# Patient Record
Sex: MALE | Race: WHITE | Employment: UNEMPLOYED | URBAN - NONMETROPOLITAN AREA
[De-identification: names, ages, dates, MRNs, and addresses within clinical notes are randomized per-mention and may not be internally consistent; named-entity substitution may affect disease eponyms.]

---

## 2017-03-02 ENCOUNTER — LAB ENCOUNTER (OUTPATIENT)
Dept: LAB | Age: 37
End: 2017-03-02
Attending: FAMILY MEDICINE
Payer: MEDICAID

## 2017-03-02 DIAGNOSIS — R56.9 SEIZURE (HCC): Primary | ICD-10-CM

## 2017-03-02 DIAGNOSIS — G40.909 EPILEPSY (HCC): ICD-10-CM

## 2017-03-02 LAB — VALPROIC ACID: 94.1 UG/ML (ref 50–100)

## 2017-03-02 PROCEDURE — 36415 COLL VENOUS BLD VENIPUNCTURE: CPT

## 2017-03-02 PROCEDURE — 80164 ASSAY DIPROPYLACETIC ACD TOT: CPT

## 2017-03-13 ENCOUNTER — OFFICE VISIT (OUTPATIENT)
Dept: FAMILY MEDICINE CLINIC | Facility: CLINIC | Age: 37
End: 2017-03-13

## 2017-03-13 VITALS
OXYGEN SATURATION: 93 % | TEMPERATURE: 98 F | DIASTOLIC BLOOD PRESSURE: 80 MMHG | SYSTOLIC BLOOD PRESSURE: 102 MMHG | HEART RATE: 105 BPM

## 2017-03-13 DIAGNOSIS — G80.8 OTHER CEREBRAL PALSY (HCC): ICD-10-CM

## 2017-03-13 DIAGNOSIS — J69.0 ASPIRATION PNEUMONIA DUE TO GASTRIC SECRETIONS, UNSPECIFIED LATERALITY, UNSPECIFIED PART OF LUNG (HCC): Primary | ICD-10-CM

## 2017-03-13 DIAGNOSIS — Z91.89 AT RISK FOR ASPIRATION: ICD-10-CM

## 2017-03-13 PROCEDURE — 99214 OFFICE O/P EST MOD 30 MIN: CPT | Performed by: FAMILY MEDICINE

## 2017-03-13 NOTE — PROGRESS NOTES
Chris Sloan is a 31year old male. Patient presents with:  Hospital F/U: f.up from [facility] from 2/19-2/27 respiratory issues. ...room 5      HPI:   Here for eval  Pt has bee in the hosp at [facility] for  resp issues  And has finished meds for aspiration 2/24/2016      Social History:    Smoking Status: Never Smoker                      Comment: Non-smoker    Alcohol Use: No                 BP Readings from Last 6 Encounters:  03/13/17 : 102/80  12/28/16 : 110/62  09/15/16 : 118/72  05/12/16 secretions, unspecified laterality, unspecified part of lung ([facility] [address]   (Inactive)  -  Primary     resolved   Discontinue all pleasure feeding as this is problematic  Continue:  Enteral Infusion Pump with alarm.                Return if symptoms worsen or fail

## 2017-03-15 ENCOUNTER — TELEPHONE (OUTPATIENT)
Dept: FAMILY MEDICINE CLINIC | Facility: CLINIC | Age: 37
End: 2017-03-15

## 2017-04-13 ENCOUNTER — TELEPHONE (OUTPATIENT)
Dept: FAMILY MEDICINE CLINIC | Facility: CLINIC | Age: 37
End: 2017-04-13

## 2017-04-13 NOTE — TELEPHONE ENCOUNTER
[phone] fax, Phone: [phone].

## 2017-04-13 NOTE — ASSESSMENT & PLAN NOTE
Discontinue all pleasure feeding as this is problematic  Continue:  Enteral Infusion Pump with alarm.

## 2017-04-19 ENCOUNTER — MED REC SCAN ONLY (OUTPATIENT)
Dept: FAMILY MEDICINE CLINIC | Facility: CLINIC | Age: 37
End: 2017-04-19

## 2017-05-02 ENCOUNTER — TELEPHONE (OUTPATIENT)
Dept: FAMILY MEDICINE CLINIC | Facility: CLINIC | Age: 37
End: 2017-05-02

## 2017-05-02 NOTE — TELEPHONE ENCOUNTER
[facility]an[facility] and he verified that even though the signature line states supplier signature that this where DR. Jha should sign and he will double check with his supervisor but this is the form they sent for all previous orders.

## 2017-06-05 ENCOUNTER — OFFICE VISIT (OUTPATIENT)
Dept: FAMILY MEDICINE CLINIC | Facility: CLINIC | Age: 37
End: 2017-06-05

## 2017-06-05 VITALS
DIASTOLIC BLOOD PRESSURE: 74 MMHG | SYSTOLIC BLOOD PRESSURE: 110 MMHG | HEART RATE: 106 BPM | RESPIRATION RATE: 10 BRPM | TEMPERATURE: 99 F | OXYGEN SATURATION: 90 %

## 2017-06-05 DIAGNOSIS — G80.8 OTHER CEREBRAL PALSY (HCC): ICD-10-CM

## 2017-06-05 DIAGNOSIS — G40.909 SEIZURE DISORDER, PRIMARY (HCC): Primary | ICD-10-CM

## 2017-06-05 PROCEDURE — 99213 OFFICE O/P EST LOW 20 MIN: CPT | Performed by: FAMILY MEDICINE

## 2017-06-05 RX ORDER — VALPROIC ACID 250 MG/5ML
17 SOLUTION ORAL EVERY 12 HOURS
Refills: 11 | COMMUNITY
Start: 2017-05-08

## 2017-06-14 NOTE — ASSESSMENT & PLAN NOTE
Recent seizures   Mild med adjust  Will see neuro soon    Seems to be back to baseline    No known trigger  This episode

## 2017-06-14 NOTE — PROGRESS NOTES
Stacie Cobian is a 31year old male. Patient presents with:  Er F/u: er for multiple seizures. . room 6      HPI:   Patient recently in the emergency room. He had multiple seizures. He has a known seizure disorder.   His medicines were mildly altered w Encounters:  12/28/16 : 90 lb  05/12/16 : 98 lb  02/24/16 : 85 lb  09/12/14 : 85 lb  04/11/14 : 95 lb  08/28/13 : 90 lb      REVIEW OF SYSTEMS:   GENERAL HEALTH: feels well no complaints  SKIN: denies any unusual skin lesions or rashes  RESPIRATORY: denies issues and agrees to the plan.

## 2017-06-19 ENCOUNTER — MED REC SCAN ONLY (OUTPATIENT)
Dept: FAMILY MEDICINE CLINIC | Facility: CLINIC | Age: 37
End: 2017-06-19

## 2017-07-26 ENCOUNTER — TELEPHONE (OUTPATIENT)
Dept: FAMILY MEDICINE CLINIC | Facility: CLINIC | Age: 37
End: 2017-07-26

## 2017-07-26 NOTE — TELEPHONE ENCOUNTER
Per Amy Gan, patient has had this wet, productive cough for two weeks, phlegm is white/clear in color, denies temps, no wheezing. Patient has been drooling a lot as well.  She has given patient Muscinex and Robitussin OTC and both of these helped elevate his

## 2017-07-26 NOTE — TELEPHONE ENCOUNTER
COUGH. TRIED TO OFFER APPT. FOR TOMORROW BUT SHE SAID DR. SIMMONS GIVES HIM ANTIBOTICS FOR HIS COUGH SOMETIMES.

## 2017-07-27 RX ORDER — LEVOFLOXACIN 500 MG/1
500 TABLET, FILM COATED ORAL DAILY
Qty: 10 TABLET | Refills: 0 | Status: SHIPPED | OUTPATIENT
Start: 2017-07-27 | End: 2017-08-06

## 2017-08-08 ENCOUNTER — MED REC SCAN ONLY (OUTPATIENT)
Dept: FAMILY MEDICINE CLINIC | Facility: CLINIC | Age: 37
End: 2017-08-08

## 2017-09-18 ENCOUNTER — OFFICE VISIT (OUTPATIENT)
Dept: FAMILY MEDICINE CLINIC | Facility: CLINIC | Age: 37
End: 2017-09-18

## 2017-09-18 VITALS
DIASTOLIC BLOOD PRESSURE: 70 MMHG | TEMPERATURE: 99 F | HEART RATE: 100 BPM | OXYGEN SATURATION: 98 % | SYSTOLIC BLOOD PRESSURE: 116 MMHG

## 2017-09-18 DIAGNOSIS — R05.3 CHRONIC COUGH: ICD-10-CM

## 2017-09-18 DIAGNOSIS — G40.909 SEIZURE DISORDER, PRIMARY (HCC): Primary | ICD-10-CM

## 2017-09-18 DIAGNOSIS — G80.9 CEREBRAL PALSY, UNSPECIFIED TYPE (HCC): ICD-10-CM

## 2017-09-18 PROCEDURE — 99214 OFFICE O/P EST MOD 30 MIN: CPT | Performed by: FAMILY MEDICINE

## 2017-09-20 NOTE — PROGRESS NOTES
Bambi Flores is a 30year old male.   Patient presents with:  Er F/u: room 6      HPI:   Pt here for follow  Was in the er  For a  Seizure    This did not last long  And pt recovered    There was no specific abnormality    And levels of meds appeared no Encounters:  09/18/17 : 116/70  06/05/17 : 110/74  03/13/17 : 102/80  12/28/16 : 110/62  09/15/16 : 118/72  05/12/16 : 98/70      Wt Readings from Last 6 Encounters:  12/28/16 : 90 lb  05/12/16 : 98 lb  02/24/16 : 85 lb  09/12/14 : 85 lb  04/11/14 : 95 lb prescriptions requested or ordered in this encounter              Iam Chen M.D., FAAFP      The patient indicates understanding of these issues and agrees to the plan.

## 2017-10-11 ENCOUNTER — OFFICE VISIT (OUTPATIENT)
Dept: FAMILY MEDICINE CLINIC | Facility: CLINIC | Age: 37
End: 2017-10-11

## 2017-10-11 VITALS
OXYGEN SATURATION: 92 % | HEART RATE: 104 BPM | TEMPERATURE: 99 F | DIASTOLIC BLOOD PRESSURE: 68 MMHG | SYSTOLIC BLOOD PRESSURE: 110 MMHG

## 2017-10-11 DIAGNOSIS — G80.8 OTHER CEREBRAL PALSY (HCC): ICD-10-CM

## 2017-10-11 DIAGNOSIS — K44.9 HIATAL HERNIA: ICD-10-CM

## 2017-10-11 DIAGNOSIS — Z93.1 GASTROSTOMY IN PLACE (HCC): ICD-10-CM

## 2017-10-11 DIAGNOSIS — R05.3 CHRONIC COUGH: Primary | ICD-10-CM

## 2017-10-11 DIAGNOSIS — Z91.89 AT RISK FOR ASPIRATION: ICD-10-CM

## 2017-10-11 PROCEDURE — 99214 OFFICE O/P EST MOD 30 MIN: CPT | Performed by: FAMILY MEDICINE

## 2017-10-11 NOTE — PROGRESS NOTES
Kimberlee Garcia is a 30year old male. Patient presents with:  Er F/u: mucus in lungs. . persistant phlegm. . room 5      HPI:   Patient here for follow-up. Has been seen in the ER recently. He has persistent cough and a lot of mucus that he produces. Polyethylene Glycol 3350 (MIRALAX) Oral Powder  Disp: 255 g Rfl: 0     No current facility-administered medications on file prior to visit.       Past Medical History:   Diagnosis Date   • At risk for aspiration 2/24/2016   • Cerebral palsy ([facility] [address] 10/2/2012 SKIN: denies any unusual skin lesions or rashes  RESPIRATORY:see HPI      GI: denies abdominal pain and denies heartburn  Ft in place   soft  NEURO: denies headaches    EXAM:   /68   Pulse 104   Temp 98.7 °F (37.1 °C) (Temporal)   SpO2 92%  There is

## 2017-10-11 NOTE — PATIENT INSTRUCTIONS
What Is a Hiatal Hernia? Hiatal hernia is when the area where the stomach and esophagus meet bulges up through the diaphragm into the chest cavity. In some cases, part of the stomach may bulge above the diaphragm.  Stomach acid may move up into the eso · Ask about surgery, if needed. Surgery is a treatment choice for some people. Your healthcare provider can determine if surgery is an option for you.     Date Last Reviewed: 10/1/2016  © [id number] The [address] [place] [address],

## 2017-11-06 ENCOUNTER — TELEPHONE (OUTPATIENT)
Dept: FAMILY MEDICINE CLINIC | Facility: CLINIC | Age: 37
End: 2017-11-06

## 2017-11-06 RX ORDER — ALBUTEROL SULFATE 2.5 MG/3ML
2.5 SOLUTION RESPIRATORY (INHALATION) EVERY 6 HOURS PRN
Qty: 75 ML | Refills: 3 | Status: SHIPPED | OUTPATIENT
Start: 2017-11-06 | End: 2018-04-06

## 2017-11-06 NOTE — TELEPHONE ENCOUNTER
ALIE WOULD LIKE TO SPEAK TO [address] [place] [address] ABOUT GETTING A NEBULIZER FOR MIKAYLA. PLEASE CALL HER BACK.

## 2017-11-06 NOTE — TELEPHONE ENCOUNTER
Spoke with Dr. Melanie Clancy; v/o to dispense nebulizer.   Mom states that pt does aspirate a lot and also has a couch     Which Neb solution should be sent to [employer]

## 2017-11-07 ENCOUNTER — OFFICE VISIT (OUTPATIENT)
Dept: FAMILY MEDICINE CLINIC | Facility: CLINIC | Age: 37
End: 2017-11-07

## 2017-11-07 VITALS — DIASTOLIC BLOOD PRESSURE: 68 MMHG | SYSTOLIC BLOOD PRESSURE: 120 MMHG | TEMPERATURE: 98 F

## 2017-11-07 DIAGNOSIS — R05.3 CHRONIC COUGH: ICD-10-CM

## 2017-11-07 DIAGNOSIS — Z93.1 GASTROSTOMY IN PLACE (HCC): ICD-10-CM

## 2017-11-07 DIAGNOSIS — J18.9 PNEUMONIA OF LEFT LOWER LOBE DUE TO INFECTIOUS ORGANISM: Primary | ICD-10-CM

## 2017-11-07 DIAGNOSIS — Z91.89 AT RISK FOR ASPIRATION: ICD-10-CM

## 2017-11-07 PROCEDURE — 99214 OFFICE O/P EST MOD 30 MIN: CPT | Performed by: FAMILY MEDICINE

## 2017-11-07 NOTE — ASSESSMENT & PLAN NOTE
Discussed possible use of anticholinergic to decrease secretion will hold for now mother seeking ENT

## 2017-11-07 NOTE — PROGRESS NOTES
Velia Le is a 30year old male. Patient presents with:  Hospital F[facility]      HPI:   Patient in [facility] October 23 - October 28 for aspiration pneumonia.     Patient has had repeated episodes with [facility] lamoTRIgine (LAMICTAL) 200 MG Oral Tab TAKE 1 TABLET BY MOUTH TWICE DAILY Disp: 60 tablet Rfl: 0   Misc.  Devices Does not apply Misc SAPNA LIFTDX CODE 780.72, 718.46, 343.9 Disp: 1 Device Rfl: 0   Polyethylene Glycol 3350 (MIRALAX) Oral Powder  Disp: 255 g HPI & Hospital Course: Patient is a 30 y.o. male with PMH of cerebral palsy with developmental delay, seizures brought in by mother. History obtained by mother at bedside. Mother was concerned for cough which patient developed over the past 3 days.  Patient with mother who endorses full understanding and is agreeable.      Disposition: to home in stable condition     Patient Instructions:     Discharge Meds  Current Discharge Medication List     START taking these medications   Details   cefUROXime 500 mg tabl REVIEW OF SYSTEMS:   GENERAL HEALTH: feels well no complaints  SKIN: denies any unusual skin lesions or rashes  RESPIRATORY: see HPI      EXAM:   /68   Temp 97.7 °F (36.5 °C) (Temporal)  There is no height or weight on file to calculate BMI.       G Sig: Use with nebulizer                     Ryder Fang M.D., FAAFP      The patient indicates understanding of these issues and agrees to the plan.

## 2017-11-16 ENCOUNTER — TELEPHONE (OUTPATIENT)
Dept: FAMILY MEDICINE CLINIC | Facility: CLINIC | Age: 37
End: 2017-11-16

## 2017-11-16 NOTE — TELEPHONE ENCOUNTER
recieved paperwork from [employer] in regards to continuation of enteral feeding supplies Not all pages received. Called and spoke with Natividad at [facility] who will refax paperwork.    Last OV printed and attached to paperwork that was recieved

## 2018-01-25 ENCOUNTER — TELEPHONE (OUTPATIENT)
Dept: FAMILY MEDICINE CLINIC | Facility: CLINIC | Age: 38
End: 2018-01-25

## 2018-01-25 NOTE — TELEPHONE ENCOUNTER
REQ. RECORDS 01-23-16 TO 01-23-18 TO [facility] FROM [facility]DIV OF [facility] SERVICES

## 2018-02-13 ENCOUNTER — APPOINTMENT (OUTPATIENT)
Dept: LAB | Age: 38
End: 2018-02-13
Attending: FAMILY MEDICINE
Payer: MEDICAID

## 2018-02-13 DIAGNOSIS — G40.909 SEIZURE DISORDER, PRIMARY (HCC): ICD-10-CM

## 2018-02-13 PROCEDURE — 36415 COLL VENOUS BLD VENIPUNCTURE: CPT

## 2018-02-13 PROCEDURE — 80164 ASSAY DIPROPYLACETIC ACD TOT: CPT

## 2018-02-14 LAB — VALPROIC ACID: 94.4 UG/ML (ref 50–100)

## 2018-03-05 ENCOUNTER — MED REC SCAN ONLY (OUTPATIENT)
Dept: FAMILY MEDICINE CLINIC | Facility: CLINIC | Age: 38
End: 2018-03-05

## 2018-04-05 ENCOUNTER — TELEPHONE (OUTPATIENT)
Dept: FAMILY MEDICINE CLINIC | Facility: CLINIC | Age: 38
End: 2018-04-05

## 2018-04-05 RX ORDER — LEVOFLOXACIN 25 MG/ML
500 SOLUTION ORAL DAILY
Qty: 200 ML | Refills: 0 | Status: SHIPPED | OUTPATIENT
Start: 2018-04-05 | End: 2018-06-21

## 2018-04-05 NOTE — TELEPHONE ENCOUNTER
Alfonso Ballard states that pt developed a cough about a week ago that is getting worse and now pt is coughing up phlegm with a greenish tint to it. Alfonso Ballard states that there is definitely chest congestion with slight ratting on inspiration.  Alfonso Ballard also states that she

## 2018-04-06 RX ORDER — ALBUTEROL SULFATE 2.5 MG/3ML
SOLUTION RESPIRATORY (INHALATION)
Qty: 75 ML | Refills: 0 | Status: SHIPPED | OUTPATIENT
Start: 2018-04-06 | End: 2018-04-30

## 2018-04-25 ENCOUNTER — TELEPHONE (OUTPATIENT)
Dept: FAMILY MEDICINE CLINIC | Facility: CLINIC | Age: 38
End: 2018-04-25

## 2018-04-25 NOTE — TELEPHONE ENCOUNTER
HE HAS [facility] NOW AND SHE WAS TOLD THAT YOU NEED TO CALL THEM TO AUTHORIZE THE ALBUTEROL NEBULIZER FOR HIM AND THE NUMBER SHE GAVE ME FOR YOU TO Sanjeev [phone]

## 2018-04-25 NOTE — TELEPHONE ENCOUNTER
Spoke to Seth at [address] [place] [address] who states that the medication is covered for 30mL/per 30 days. Per Seth there needs to be quantity override.    Override performed with Seth  Ref # [id number]  Expedited status, will know in 24 hours

## 2018-04-26 NOTE — TELEPHONE ENCOUNTER
Fax received from [employer]. Message on fax: This medication does not require authorization.  It is covered at the lowest possible copay and further copay lowering cannot be approved    Arturo Farfan advised and states she will call [facility] again and discuss

## 2018-05-01 RX ORDER — ALBUTEROL SULFATE 2.5 MG/3ML
SOLUTION RESPIRATORY (INHALATION)
Qty: 75 ML | Refills: 0 | Status: SHIPPED | OUTPATIENT
Start: 2018-05-01 | End: 2018-07-10

## 2018-06-20 ENCOUNTER — TELEPHONE (OUTPATIENT)
Dept: FAMILY MEDICINE CLINIC | Facility: CLINIC | Age: 38
End: 2018-06-20

## 2018-06-20 NOTE — TELEPHONE ENCOUNTER
Mom states that pt is having greenish mucus and productive cough. Mom denies fever or decreasing O2 sats. Mom states she has been giving pt a mucus thinner but thinks he needs an abx as well. Odell Mukherjee that Dr. Emy Foote is out of the office.  Alfonso Ballard is re

## 2018-06-21 RX ORDER — LEVOFLOXACIN 25 MG/ML
500 SOLUTION ORAL DAILY
Qty: 200 ML | Refills: 0 | Status: SHIPPED | OUTPATIENT
Start: 2018-06-21 | End: 2018-09-28

## 2018-06-23 ENCOUNTER — MED REC SCAN ONLY (OUTPATIENT)
Dept: FAMILY MEDICINE CLINIC | Facility: CLINIC | Age: 38
End: 2018-06-23

## 2018-06-27 ENCOUNTER — TELEPHONE (OUTPATIENT)
Dept: FAMILY MEDICINE CLINIC | Facility: CLINIC | Age: 38
End: 2018-06-27

## 2018-07-05 ENCOUNTER — MED REC SCAN ONLY (OUTPATIENT)
Dept: FAMILY MEDICINE CLINIC | Facility: CLINIC | Age: 38
End: 2018-07-05

## 2018-07-06 ENCOUNTER — OFFICE VISIT (OUTPATIENT)
Dept: FAMILY MEDICINE CLINIC | Facility: CLINIC | Age: 38
End: 2018-07-06

## 2018-07-06 VITALS
HEART RATE: 96 BPM | OXYGEN SATURATION: 93 % | SYSTOLIC BLOOD PRESSURE: 102 MMHG | TEMPERATURE: 99 F | DIASTOLIC BLOOD PRESSURE: 72 MMHG

## 2018-07-06 DIAGNOSIS — H55.09 ROTARY NYSTAGMUS: ICD-10-CM

## 2018-07-06 DIAGNOSIS — G40.909 SEIZURE DISORDER, PRIMARY (HCC): ICD-10-CM

## 2018-07-06 DIAGNOSIS — G80.8 OTHER CEREBRAL PALSY (HCC): ICD-10-CM

## 2018-07-06 DIAGNOSIS — Z93.1 GASTROSTOMY IN PLACE (HCC): ICD-10-CM

## 2018-07-06 DIAGNOSIS — Z00.00 ROUTINE GENERAL MEDICAL EXAMINATION AT A HEALTH CARE FACILITY: Primary | ICD-10-CM

## 2018-07-06 PROCEDURE — 99395 PREV VISIT EST AGE 18-39: CPT | Performed by: FAMILY MEDICINE

## 2018-07-06 NOTE — PROGRESS NOTES
Aydee Mcfarlane is a 30year old male.     CC:  Patient presents with:  Physical: room 5    Subjective   HPI:  Still a lot of mucus    Allergies:  No Known Allergies   Current Meds:    Current Outpatient Prescriptions on File Prior to Visit:  HARDY [place] Onset   • Diabetes Other         No family status information on file. Smoking status: Never Smoker                                                              Comment: Non-smoker  Alcohol use:  No                     Immunization History  Administere is not tender,FROM of the back  Contractures and  Slight stoop forward  EXTREMITIES: no cyanosis, clubbing or edema  NEURO: baseline  Makes verbal noises  Seems to respond to mother                ASSESSMENT:    Problem List Items Addressed This Visit

## 2018-07-09 PROBLEM — J18.9 PNEUMONIA OF LEFT LOWER LOBE DUE TO INFECTIOUS ORGANISM: Status: RESOLVED | Noted: 2017-10-23 | Resolved: 2018-07-09

## 2018-07-10 RX ORDER — ALBUTEROL SULFATE 2.5 MG/3ML
SOLUTION RESPIRATORY (INHALATION)
Qty: 75 ML | Refills: 0 | Status: SHIPPED | OUTPATIENT
Start: 2018-07-10 | End: 2018-08-25

## 2018-07-13 ENCOUNTER — TELEPHONE (OUTPATIENT)
Dept: FAMILY MEDICINE CLINIC | Facility: CLINIC | Age: 38
End: 2018-07-13

## 2018-07-13 NOTE — TELEPHONE ENCOUNTER
Physical faxed to [facility] after verifying correct fax number.

## 2018-08-06 ENCOUNTER — TELEPHONE (OUTPATIENT)
Dept: FAMILY MEDICINE CLINIC | Facility: CLINIC | Age: 38
End: 2018-08-06

## 2018-08-06 RX ORDER — MONTELUKAST SODIUM 4 MG/500MG
4 GRANULE ORAL NIGHTLY
Qty: 30 PACKET | Refills: 0 | Status: SHIPPED | OUTPATIENT
Start: 2018-08-06 | End: 2018-08-28

## 2018-08-06 NOTE — TELEPHONE ENCOUNTER
Per Dr. Jha: ok to switch to 4mg granulated as 10mg does not come granulated  Med sent  Amboy advised

## 2018-08-06 NOTE — TELEPHONE ENCOUNTER
Mom states that pt is very congested from his allergies and pt has been on Zyrtec for a while but it doesn't seem to be helping.  Mom is wanting to know if pt can be switched to Singulair granules instead  Please send to [facility]

## 2018-08-14 ENCOUNTER — TELEPHONE (OUTPATIENT)
Dept: FAMILY MEDICINE CLINIC | Facility: CLINIC | Age: 38
End: 2018-08-14

## 2018-08-25 RX ORDER — ALBUTEROL SULFATE 2.5 MG/3ML
SOLUTION RESPIRATORY (INHALATION)
Qty: 75 ML | Refills: 0 | Status: SHIPPED | OUTPATIENT
Start: 2018-08-25 | End: 2018-10-07

## 2018-08-28 ENCOUNTER — TELEPHONE (OUTPATIENT)
Dept: FAMILY MEDICINE CLINIC | Facility: CLINIC | Age: 38
End: 2018-08-28

## 2018-08-28 RX ORDER — MONTELUKAST SODIUM 5 MG/1
TABLET, CHEWABLE ORAL
Qty: 60 TABLET | Refills: 0 | Status: SHIPPED | OUTPATIENT
Start: 2018-08-28 | End: 2018-10-26

## 2018-08-28 NOTE — TELEPHONE ENCOUNTER
Mom is wanting to know if the Singulair that was sent in can be increased to an adult dose. Mom states that the 4mg is for kids/infants.  Also mom is wanting to know  If the granules can be dissolved in water as the box only references applesauce, formula,

## 2018-08-28 NOTE — TELEPHONE ENCOUNTER
Spoke with Dr. Ashley Florez in regards to pt's needs: v/o  10mg chewable tablets (2 5mg tablets) dissolved in water.     Alexus Kelley advised and verbalized understanding of the information provided  Med sent

## 2018-08-29 ENCOUNTER — TELEPHONE (OUTPATIENT)
Dept: FAMILY MEDICINE CLINIC | Facility: CLINIC | Age: 38
End: 2018-08-29

## 2018-08-29 NOTE — TELEPHONE ENCOUNTER
Approval for Montelukast 5mg chewable tablets received with sig of pt is to crush 2 tablets daily to total 10mg and dissolve in water (g-tube)    Approval good from 8/28/2018 thru 8/28/2019    Left detailed message on [facility] vm

## 2018-08-29 NOTE — TELEPHONE ENCOUNTER
Spoke with Linda Trimble at [facility] that a PA form will be faxed to the office and turn around time will be 24 to 72 hours  Hold message for paperwork

## 2018-09-18 ENCOUNTER — APPOINTMENT (OUTPATIENT)
Dept: LAB | Age: 38
End: 2018-09-18
Attending: FAMILY MEDICINE
Payer: MEDICAID

## 2018-09-18 DIAGNOSIS — G40.909 SEIZURE DISORDER, PRIMARY (HCC): ICD-10-CM

## 2018-09-18 LAB — VALPROATE SERPL-MCNC: 104.5 UG/ML (ref 50–100)

## 2018-09-18 PROCEDURE — 80164 ASSAY DIPROPYLACETIC ACD TOT: CPT

## 2018-09-18 PROCEDURE — 36415 COLL VENOUS BLD VENIPUNCTURE: CPT

## 2018-09-19 DIAGNOSIS — G40.909 SEIZURE DISORDER, PRIMARY (HCC): Primary | ICD-10-CM

## 2018-09-28 ENCOUNTER — TELEPHONE (OUTPATIENT)
Dept: FAMILY MEDICINE CLINIC | Facility: CLINIC | Age: 38
End: 2018-09-28

## 2018-09-28 RX ORDER — LEVOFLOXACIN 25 MG/ML
SOLUTION ORAL
Qty: 200 ML | Refills: 0 | Status: SHIPPED | OUTPATIENT
Start: 2018-09-28 | End: 2018-10-26

## 2018-09-28 NOTE — TELEPHONE ENCOUNTER
MIKAYLA HAS ANOTHER RESPIRATORY INFECTION W/GREEN PHLEM. MOM WOULD LIKE TO SPK WITH NURSE.  PLEASE CALL

## 2018-09-28 NOTE — TELEPHONE ENCOUNTER
Gets respiratory infection frequently,prone to aspiration. the last time she noticed the green phlegm was 6/18. Was treated with levaquin at that time. Currently no fever, c/o cough. [facility] [place]. Needs liquid to go through PEG.

## 2018-09-28 NOTE — TELEPHONE ENCOUNTER
Would recommend the use of albuterol nebulizer treatments 4 times daily  May start Levaquin suspension, 500 mg daily times 10 days via G-tube  If fevers develop, cough becomes more productive, any signs of respiratory distress, patient will need emergency

## 2018-10-03 ENCOUNTER — TELEPHONE (OUTPATIENT)
Dept: FAMILY MEDICINE CLINIC | Facility: CLINIC | Age: 38
End: 2018-10-03

## 2018-10-03 NOTE — TELEPHONE ENCOUNTER
SHE SAID THAT HE WAS ADMITTED TO [address] [place] [address] ON [address] AND SHE WILL LET YOU KNOW WHEN HE IS DISCHARGED

## 2018-10-08 RX ORDER — ALBUTEROL SULFATE 2.5 MG/3ML
SOLUTION RESPIRATORY (INHALATION)
Qty: 75 ML | Refills: 0 | Status: SHIPPED | OUTPATIENT
Start: 2018-10-08 | End: 2018-11-29

## 2018-10-10 ENCOUNTER — TELEPHONE (OUTPATIENT)
Dept: FAMILY MEDICINE CLINIC | Facility: CLINIC | Age: 38
End: 2018-10-10

## 2018-10-10 NOTE — TELEPHONE ENCOUNTER
FELI: Mirlande Glass TO LET DR Priyanka Joyner KNOW ARIELLA WAS DISCHARGED FROM [facility] 10/4/18 AND RETURNED TO HIS GROUP HOME

## 2018-10-16 ENCOUNTER — TELEPHONE (OUTPATIENT)
Dept: FAMILY MEDICINE CLINIC | Facility: CLINIC | Age: 38
End: 2018-10-16

## 2018-10-16 NOTE — TELEPHONE ENCOUNTER
Try milk of magnesia to help with the bowel movement. Packing P bought over-the-counter and taken as directed. I printed out a list of other things he can try for the hiccups.

## 2018-10-16 NOTE — TELEPHONE ENCOUNTER
Alexus Kelley states that patient developed the hiccups Thursday and it has not stopped. Alexus Kelley states that pt is mildly bloated but she feels that is due to pt not having a decent BM in a couple days. Alexus Kelley denies fever or patient appearing to be uncomfortable.  An

## 2018-10-26 ENCOUNTER — OFFICE VISIT (OUTPATIENT)
Dept: FAMILY MEDICINE CLINIC | Facility: CLINIC | Age: 38
End: 2018-10-26
Payer: MEDICAID

## 2018-10-26 VITALS
HEART RATE: 102 BPM | OXYGEN SATURATION: 93 % | SYSTOLIC BLOOD PRESSURE: 98 MMHG | TEMPERATURE: 99 F | DIASTOLIC BLOOD PRESSURE: 60 MMHG

## 2018-10-26 DIAGNOSIS — G40.909 SEIZURE DISORDER, PRIMARY (HCC): Primary | ICD-10-CM

## 2018-10-26 PROCEDURE — 1111F DSCHRG MED/CURRENT MED MERGE: CPT | Performed by: FAMILY MEDICINE

## 2018-10-26 PROCEDURE — 99214 OFFICE O/P EST MOD 30 MIN: CPT | Performed by: FAMILY MEDICINE

## 2018-10-26 RX ORDER — MONTELUKAST SODIUM 5 MG/1
TABLET, CHEWABLE ORAL
Qty: 60 TABLET | Refills: 11 | Status: SHIPPED | OUTPATIENT
Start: 2018-10-26

## 2018-10-26 RX ORDER — LORATADINE 10 MG/1
10 TABLET ORAL
COMMUNITY
Start: 2018-10-05

## 2018-10-29 NOTE — PROGRESS NOTES
Melita Silva is a 41year old male.   Patient presents with:  Hospital F/U    Subjective   HPI:   Here after hospital stay  Patient had seizures  These resolved    Thought related to his metabolic state and the levels were ok    So no changes in medic START taking these medications   Details   amoxicillin-clavulanate 875-125 mg per tablet 1 tablet by Per PEG Tube route 2 (two) times daily for 5 days. Qty: 10 tablet, Refills: 0     loratadine 10 mg tablet 1 tablet by Per PEG Tube route daily.   Qty: HOURS AS NEEDED FOR WHEEZING Disp: 75 mL Rfl: 0   Multiple Vitamin (MULTI-VITAMIN DAILY) Oral Tab Take  by mouth. Disp:  Rfl:    Oral Hygiene Products (OROPHARYNGEAL SUCTION CATHETER) Mouth/Throat Misc PT NEEDING PORTABLE ORAL SUCTION DEVICE AND SUPPLIES. no complaints--by mother caretaker report    SKIN: denies any unusual skin lesions or rashes  RESPIRATORY: denies shortness of breath with exertion  CARDIOVASCULAR: denies chest pain on exertion  GI: denies abdominal pain and denies heartburn  NEURO: denie

## 2018-10-31 ENCOUNTER — TELEPHONE (OUTPATIENT)
Dept: FAMILY MEDICINE CLINIC | Facility: CLINIC | Age: 38
End: 2018-10-31

## 2018-10-31 NOTE — TELEPHONE ENCOUNTER
PT MOM HAS QUESTION ABOUT MIKAYLA NOT FEELING WELL, TEMP [id number] NOT SURE IF INFECTION.  PLEASE CALL

## 2018-10-31 NOTE — TELEPHONE ENCOUNTER
Mom states that this morning pt had a temp of 99 and she did provide Tylenol. Mom states that the fever did go down. Mom also states that pt gets a little sweaty so she uses cool rags to help cool him off.  Advised mom that it does not sound like an infecti

## 2018-11-08 ENCOUNTER — TELEPHONE (OUTPATIENT)
Dept: FAMILY MEDICINE CLINIC | Facility: CLINIC | Age: 38
End: 2018-11-08

## 2018-11-09 RX ORDER — LACTULOSE 10 G/15ML
10 SOLUTION ORAL 2 TIMES DAILY
Qty: 180 ML | Refills: 0 | Status: SHIPPED | OUTPATIENT
Start: 2018-11-09

## 2018-11-09 NOTE — TELEPHONE ENCOUNTER
Mom calls back, he's experiencing no BM for the past couple of days. Prior to that he was ill last Wednesday, diarrhea, fever, for 24 hours. From last Wednesday until this Wednesday continued with liquid, foul smelling stools.  Mom started giving him probi

## 2018-11-09 NOTE — TELEPHONE ENCOUNTER
Yes   Because he has certainly had constipation problems    Can he take something oral  Like   Lactulose 15/5 1 tsp po bid  Until stools regular again

## 2018-11-29 RX ORDER — ALBUTEROL SULFATE 2.5 MG/3ML
SOLUTION RESPIRATORY (INHALATION)
Qty: 75 ML | Refills: 0 | Status: SHIPPED | OUTPATIENT
Start: 2018-11-29 | End: 2018-12-31

## 2018-12-03 ENCOUNTER — TELEPHONE (OUTPATIENT)
Dept: FAMILY MEDICINE CLINIC | Facility: CLINIC | Age: 38
End: 2018-12-03

## 2018-12-03 DIAGNOSIS — G40.909 SEIZURE DISORDER, PRIMARY (HCC): Primary | ICD-10-CM

## 2018-12-03 NOTE — TELEPHONE ENCOUNTER
Order placed.   Future Appointments   Date Time Provider Haley Villafuerte   12/4/2018  3:00 PM [facility] [place]

## 2018-12-03 NOTE — TELEPHONE ENCOUNTER
MOM IS ASKING WHAT LABS ARIELLA SHOULD HAVE TO SHED LIGHT ON HIS ONGOING HEALTH ISSUES? MOM SAYS SHE DOESN'T THINKS HE HAS HAD ANNUAL LAB WORK DONE.  PLEASE CALL

## 2018-12-04 ENCOUNTER — IMMUNIZATION (OUTPATIENT)
Dept: FAMILY MEDICINE CLINIC | Facility: CLINIC | Age: 38
End: 2018-12-04
Payer: MEDICAID

## 2018-12-04 DIAGNOSIS — Z23 NEED FOR VACCINATION: ICD-10-CM

## 2018-12-04 PROCEDURE — 90471 IMMUNIZATION ADMIN: CPT | Performed by: FAMILY MEDICINE

## 2018-12-04 PROCEDURE — 90686 IIV4 VACC NO PRSV 0.5 ML IM: CPT | Performed by: FAMILY MEDICINE

## 2018-12-19 ENCOUNTER — TELEPHONE (OUTPATIENT)
Dept: FAMILY MEDICINE CLINIC | Facility: CLINIC | Age: 38
End: 2018-12-19

## 2018-12-19 RX ORDER — LEVOFLOXACIN 25 MG/ML
500 SOLUTION ORAL DAILY
Qty: 200 ML | Refills: 0 | Status: SHIPPED | OUTPATIENT
Start: 2018-12-19 | End: 2018-12-29

## 2018-12-19 NOTE — TELEPHONE ENCOUNTER
Left detailed message for Joanna Bustamante in regards to medication being sent to the pharmacy.  Office number provided for call back if needed

## 2018-12-19 NOTE — TELEPHONE ENCOUNTER
Mom states that pt has a been coughing a lot form a chest cold and she does not want it to turn into something more. Mom states that she is using the nebulizer but is wanting to know if an abx can be added.

## 2018-12-31 RX ORDER — ALBUTEROL SULFATE 2.5 MG/3ML
SOLUTION RESPIRATORY (INHALATION)
Qty: 75 ML | Refills: 0 | Status: SHIPPED | OUTPATIENT
Start: 2018-12-31 | End: 2019-03-05

## 2019-01-11 ENCOUNTER — APPOINTMENT (OUTPATIENT)
Dept: LAB | Age: 39
End: 2019-01-11
Attending: FAMILY MEDICINE
Payer: MEDICAID

## 2019-01-11 ENCOUNTER — TELEPHONE (OUTPATIENT)
Dept: FAMILY MEDICINE CLINIC | Facility: CLINIC | Age: 39
End: 2019-01-11

## 2019-01-11 DIAGNOSIS — G40.909 SEIZURE DISORDER, PRIMARY (HCC): ICD-10-CM

## 2019-01-11 LAB — VALPROATE SERPL-MCNC: 108.5 UG/ML (ref 50–100)

## 2019-01-11 PROCEDURE — 80175 DRUG SCREEN QUAN LAMOTRIGINE: CPT

## 2019-01-11 PROCEDURE — 80164 ASSAY DIPROPYLACETIC ACD TOT: CPT

## 2019-01-11 PROCEDURE — 36415 COLL VENOUS BLD VENIPUNCTURE: CPT

## 2019-01-11 NOTE — TELEPHONE ENCOUNTER
HIS LAB RESULTS NEED TO BE FAXED TO DR Mello [place]  S NUMBER [phone].   HE WAS JUST HERE FOR THEM TO BE DRAWN

## 2019-01-12 LAB — LAMOTRIGINE: 14.3 UG/ML

## 2019-01-31 ENCOUNTER — TELEPHONE (OUTPATIENT)
Dept: FAMILY MEDICINE CLINIC | Facility: CLINIC | Age: 39
End: 2019-01-31

## 2019-01-31 NOTE — TELEPHONE ENCOUNTER
David Villegas that Dr. Zari Holcomb gets a renewal form form [facility] but the last on received was 2/26/2018. Niki Aguilar states she will lois and see if renewal form can be faxed over and filled out cause she doesn't want there to be issues.  Noted

## 2019-02-06 ENCOUNTER — TELEPHONE (OUTPATIENT)
Dept: FAMILY MEDICINE CLINIC | Facility: CLINIC | Age: 39
End: 2019-02-06

## 2019-02-06 RX ORDER — LACTOSE-REDUCED FOOD/FIBER
LIQUID (ML) ORAL
Qty: 5 CAN | Refills: 11 | Status: SHIPPED
Start: 2019-02-06

## 2019-02-06 RX ORDER — FEEDER CONT, GRAVITY SET,ENFIT
1 EACH MISCELLANEOUS DAILY
Qty: 30 EACH | Refills: 11 | Status: SHIPPED
Start: 2019-02-06

## 2019-02-06 RX ORDER — LACTOSE-REDUCED FOOD/FIBER
LIQUID (ML) ORAL
Qty: 8 CAN | Refills: 11 | Status: SHIPPED
Start: 2019-02-06 | End: 2019-02-06 | Stop reason: CLARIF

## 2019-02-06 NOTE — TELEPHONE ENCOUNTER
Request received for orders for Jevity 1.2, 2000 calories per day and 30 per month gravity bags  Scripts faxed to [employer] @ [phone]

## 2019-02-12 ENCOUNTER — MED REC SCAN ONLY (OUTPATIENT)
Dept: FAMILY MEDICINE CLINIC | Facility: CLINIC | Age: 39
End: 2019-02-12

## 2019-03-04 ENCOUNTER — TELEPHONE (OUTPATIENT)
Dept: FAMILY MEDICINE CLINIC | Facility: CLINIC | Age: 39
End: 2019-03-04

## 2019-03-04 NOTE — TELEPHONE ENCOUNTER
MED REC REQ RECEIVED 3/4/2019 FROM [facility]/DIVISION OF REHAB. SENT TO [facility] 3/4/2019. NO EMR RECORDS ATTACHED.   DOS REQ: 2/25/2018-2/25/2019

## 2019-03-05 RX ORDER — ALBUTEROL SULFATE 2.5 MG/3ML
SOLUTION RESPIRATORY (INHALATION)
Qty: 75 ML | Refills: 3 | Status: SHIPPED | OUTPATIENT
Start: 2019-03-05

## 2019-03-07 ENCOUNTER — MED REC SCAN ONLY (OUTPATIENT)
Dept: FAMILY MEDICINE CLINIC | Facility: CLINIC | Age: 39
End: 2019-03-07

## 2019-04-03 NOTE — TELEPHONE ENCOUNTER
Per call to Aislinn Aguilar at [facility] they will re-fax the records to [facility] Rehab per req. From Div. Of Rehab.

## 2019-04-25 ENCOUNTER — TELEPHONE (OUTPATIENT)
Dept: FAMILY MEDICINE CLINIC | Facility: CLINIC | Age: 39
End: 2019-04-25

## 2019-04-25 NOTE — TELEPHONE ENCOUNTER
Last rf 10/26/18 #[id number]  Last ov 10/26/18    Fax from [facility] for 2 tablets a day. Dr. Josh Kendrick is not contracted with [facility] so we can not do the prior auth. I spoke to the pt's mom.  She is aware that we can not do the prio

## 2019-05-13 ENCOUNTER — MED REC SCAN ONLY (OUTPATIENT)
Dept: FAMILY MEDICINE CLINIC | Facility: CLINIC | Age: 39
End: 2019-05-13

## 2019-09-25 ENCOUNTER — TELEPHONE (OUTPATIENT)
Dept: FAMILY MEDICINE CLINIC | Facility: CLINIC | Age: 39
End: 2019-09-25

## 2019-12-09 NOTE — TELEPHONE ENCOUNTER
Mom states that she just want to see what the patients labs are doing, be sure he is where he is supposed to be. Mom wants vit d checked and other labs    [id number] Melissa Hartmann for CMP, CBC, Vit D  Any others? ?? Vaccine Information Statement(s) was given today. This has been reviewed, questions answered, and verbal consent given by Patient for injection(s) and administration of Influenza (Inactivated).    1. Does the patient have a moderate to severe fever?  No  2. Has the patient had a serious reaction to a flu shot before?   No  3. Has the patient ever had Guillian Killeen Syndrome within 6 weeks of a previous flu shot?  No  4. Is the patient less than 6 months of age?  No    Patient is eligible to receive the vaccine based on all questions being answered as 'No'.    Patient tolerated without incident. See immunization grid for documentation.

## 2020-01-06 RX ORDER — ALBUTEROL SULFATE 90 UG/1
AEROSOL, METERED RESPIRATORY (INHALATION)
Qty: 8.5 G | Refills: 0 | OUTPATIENT
Start: 2020-01-06

## 2023-01-06 ENCOUNTER — PATIENT OUTREACH (OUTPATIENT)
Dept: CASE MANAGEMENT | Age: 43
End: 2023-01-06

## 2023-01-06 NOTE — PROCEDURES
The office order for PCP request is Approved and completed on January 6, 2023.     Thanks,  [facility] [employer]

## 2024-01-26 ENCOUNTER — TELEPHONE (OUTPATIENT)
Dept: FAMILY MEDICINE CLINIC | Facility: CLINIC | Age: 44
End: 2024-01-26

## 2024-01-26 NOTE — TELEPHONE ENCOUNTER
[place] [employer] us requesting medical history, psychiatric history, clinical findings, lab findings, imaging reports, treatment prescribed and the response, diagnosis and prognosis 9/16/2003 to present. SENT TO [facility] 1/26/2024

## (undated) NOTE — MR AVS SNAPSHOT
[facility]  [address] [address]  [phone]               Thank you for choosing us for your health care visit with Crista Salazar MD.  We are glad to serve you and happy to provide you with this summary o Sign up for [employer], your secure online medical record. [employer] will allow you to access patient instructions from your recent visit,  view other health information, and more. To sign up or find more information, go to [email or web address]

## (undated) NOTE — LETTER
01/08/19        [address] [facility]            Dear Amanuel Ziegler,    [address] [facility] records indicate that you are due for blood work ([facility])    Please call our office during normal business hours so that we may schedule this appoint

## (undated) NOTE — MR AVS SNAPSHOT
[facility]  [address] [id number] [phone]               Thank you for choosing us for your health care visit with Verito Avendaño MD.  We are glad to serve you and happy to provide you with this summary o Inhale 2 puffs into the lungs daily. Valproate Sodium 250 MG/5ML Soln   Take 17 mL by mouth every 12 (twelve) hours. Commonly known as:  DEPAKENE           ZANTAC OR   Inject as directed.                 Where to Get Your Medications      These